# Patient Record
Sex: MALE | Race: WHITE | Employment: UNEMPLOYED | URBAN - METROPOLITAN AREA
[De-identification: names, ages, dates, MRNs, and addresses within clinical notes are randomized per-mention and may not be internally consistent; named-entity substitution may affect disease eponyms.]

---
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
## 2024-07-09 ENCOUNTER — TELEPHONE (OUTPATIENT)
Age: 19
End: 2024-07-09

## 2024-07-09 NOTE — TELEPHONE ENCOUNTER
I need to see him as a new patient- can you find a slot next two week for him? 3:00 works   His cell is [phone] and if you cannot get him , try his mom [phone] -emily grey

## 2024-07-09 NOTE — TELEPHONE ENCOUNTER
I called both and left voice mails on both boxes, doesn't look like the mom uses my chart or I'd send her a message also, scheduled for 07/22 at 3 PM

## 2024-07-24 NOTE — PROGRESS NOTES
Extraocular Movements: Extraocular movements intact.      Conjunctiva/sclera: Conjunctivae normal.   Cardiovascular:      Rate and Rhythm: Normal rate and regular rhythm.   Pulmonary:      Effort: Pulmonary effort is normal.      Breath sounds: Normal breath sounds.   Abdominal:      General: Bowel sounds are normal.      Palpations: Abdomen is soft.   Musculoskeletal:         General: Normal range of motion.      Cervical back: Normal range of motion.   Skin:     General: Skin is warm.   Neurological:      General: No focal deficit present.      Mental Status: He is alert and oriented to person, place, and time. Mental status is at baseline.   Psychiatric:         Mood and Affect: Mood normal.         Behavior: Behavior normal.         Thought Content: Thought content normal.         Judgment: Judgment normal.            On this date 7/25/2024 I have spent 30 minutes reviewing previous notes, test results and face to face with the patient discussing the diagnosis and importance of compliance with the treatment plan as well as documenting on the day of the visit.      An electronic signature was used to authenticate this note.    --Irma Nick MD

## 2024-07-25 ENCOUNTER — OFFICE VISIT (OUTPATIENT)
Age: 19
End: 2024-07-25
Payer: COMMERCIAL

## 2024-07-25 VITALS
WEIGHT: 178.2 LBS | OXYGEN SATURATION: 98 % | HEART RATE: 89 BPM | HEIGHT: 72 IN | TEMPERATURE: 98.2 F | RESPIRATION RATE: 16 BRPM | BODY MASS INDEX: 24.14 KG/M2 | SYSTOLIC BLOOD PRESSURE: 114 MMHG | DIASTOLIC BLOOD PRESSURE: 70 MMHG

## 2024-07-25 DIAGNOSIS — Z00.00 ROUTINE GENERAL MEDICAL EXAMINATION AT A HEALTH CARE FACILITY: Primary | ICD-10-CM

## 2024-07-25 PROCEDURE — 99385 PREV VISIT NEW AGE 18-39: CPT | Performed by: INTERNAL MEDICINE

## 2024-07-25 RX ORDER — TADALAFIL 2.5 MG/1
TABLET ORAL
COMMUNITY

## 2024-08-23 RX ORDER — MONTELUKAST SODIUM 10 MG/1
10 TABLET ORAL DAILY
Qty: 90 TABLET | Refills: 3 | Status: SHIPPED | OUTPATIENT
Start: 2024-08-23